# Patient Record
Sex: FEMALE | Race: WHITE | Employment: UNEMPLOYED | URBAN - METROPOLITAN AREA
[De-identification: names, ages, dates, MRNs, and addresses within clinical notes are randomized per-mention and may not be internally consistent; named-entity substitution may affect disease eponyms.]

---

## 2017-07-24 PROBLEM — F31.12 BIPOLAR I DISORDER, MOST RECENT EPISODE (OR CURRENT) MANIC, MODERATE (HCC): Status: ACTIVE | Noted: 2017-07-24

## 2024-03-25 NOTE — H&P (VIEW-ONLY)
Juan Antonio Israel is a 63 year old female.   Patient presents with:  Pre-Op Exam: Excision left tonsil lesion, excision uvula lesion on 4/3/24 w/ Dr. Robins at [facility]    HPI:    Here for preop before    Surgery: left tonsil lesion and uvula lesion excision  Date of surgery: 4/3/24  Requesting physician: Dr Manolo Robins  Indication: Left tonsil lesion, uvula lesion    Has hx VANGIE, doesn't use cpap. No other known hx of heart, lung or bleeding/clotting problems.     No prior problems with anesthesia with prior surgeries.     No current infectious symptoms.     Never chest pain. Usually walks daily and does chores. No chest pain with that.               Allergies:    Latex                   HIVES, RASH  Latex                   RASH   Current Meds:  Current Outpatient Medications   Medication Sig Dispense Refill   • amphetamine-dextroamphetamine (ADDERALL) 20 MG Oral Tab Take 2 tablets (40mg) PO  tablet 0   • ATORVASTATIN 40 MG Oral Tab TAKE 1 TABLET BY MOUTH EVERY DAY 90 tablet 0   • venlafaxine ER 75 MG Oral Capsule SR 24 Hr TAKE 1 CAPSULE BY MOUTH WITH BREAKFAST ALONG WITH VENLAFAXINE 150MG FOR TOTAL 225MG 90 capsule 3   • venlafaxine  MG Oral Capsule SR 24 Hr Take 1 capsule (150 mg total) by mouth daily with breakfast. 90 capsule 3   • lamoTRIgine 25 MG Oral Tab Take 1 tablet (25 mg total) by mouth 2 (two) times daily. For anxiety/agitation 180 tablet 3   • cloNIDine 0.1 MG Oral Tab Take 1-2 tablets (0.1-0.2 mg total) by mouth at bedtime. For sleep 180 tablet 3   • [START ON 4/14/2024] amphetamine-dextroamphetamine (ADDERALL) 20 MG Oral Tab Take 2 tablets (40mg) PO  tablet 0   • amphetamine-dextroamphetamine (ADDERALL) 20 MG Oral Tab Take 2 tablets (40 mg total) by mouth 2 (two) times daily. 120 tablet 0   • lurasidone (LATUDA) 40 MG Oral Tab Take 1 tablet (40 mg total) by mouth daily with breakfast. 90 tablet 3   • levothyroxine 112 MCG Oral Tab Take 1 tablet (112 mcg total) by mouth daily. 90  tablet 3          ROS:   Otherwise negative    Past Medical History:   Diagnosis Date   • ADHD (attention deficit hyperactivity disorder)     diagnosed at [facility] in  and is on disabilty   • Bipolar disorder (HCC)    • BRCA1 negative    • BRCA2 negative    • Cochlear implant status 2013   • Depression 2009   • ETOH abuse 2014   • H/O ETOH ABUSE 2009   • HEARING LOSS - deafness dev over 10 yrs unknown cause, deaf age 33, s/p coclear implant 2009   • Hyperlipidemia 2009   • Hypothyroidism 2009   • VANGIE (obstructive sleep apnea)    • Psychosis (HCC) 2017   • Tobacco Abuse 2011    smoker     Past Surgical History:   Procedure Laterality Date   • ABLATION  2010    D&C/hysteroscopy/Veronica sommer   • ADENOIDECTOMY     •   1984    breech   • COCHLEAR IMPLANT EXT SPEECH     • COLONOSCOPY     • HYSTEROSCOPY      normal cavity, EMC negative, raymundo Sommer   • MUSCLE BIOPSY      benign   • TONSILLECTOMY  childhood     Social History    Socioeconomic History      Marital status:       Spouse name: Not on file      Number of children: 4      Years of education: Not on file      Highest education level: Not on file    Occupational History      Occupation: Disability since     Tobacco Use      Smoking status: Every Day        Packs/day: 0.50        Years: 15.00        Additional pack years: 0.00        Total pack years: 7.50        Types: Cigarettes      Smokeless tobacco: Never      Tobacco comments: Trying to quit    Substance and Sexual Activity      Alcohol use: No        Alcohol/week: 0.0 standard drinks of alcohol        Comment: ex ETOH use      Drug use: No      Sexual activity: Yes        Partners: Male        Birth control/protection: Rhythm    Other Topics      Concerns:        Not on file    Social History Narrative            4 children      Disability since  (deafness and depression/ADHD)    Social  Determinants of Health  Financial Resource Strain: Not on file  Food Insecurity: Not on file  Transportation Needs: Not on file  Physical Activity: Not on file  Stress: Not on file  Social Connections: Not on file  Intimate Partner Violence: Not on file  Housing Stability: Not on file     PHYSICAL EXAM:   /83 (BP Location: Left arm, Patient Position: Sitting, Cuff Size: large)   Pulse 103   Ht 5' 9\" (1.753 m)   Wt 214 lb (97.1 kg)   LMP  (LMP Unknown)   BMI 31.60 kg/m²   GENERAL: well developed, well nourished, in no apparent distress  SKIN: no rashes, no suspicious lesions  EYES: PERRLA, EOMI, sclera anicteric, conjunctiva normal  HEENT:oropharynx clear  NECK: supple; FROM; nontender, no thyromegaly or adenopathy  RESPIRATORY: clear to auscultation  CARDIOVASCULAR:rrr no m/r/g  ABDOMEN: normal active BS+, soft, nondistended; no masses; nontender  EXTREMITIES: no cyanosis, clubbing or edema, peripheral pulses intact  NEUROLOGIC: intact; no sensorimotor deficit  PSYCHIATRIC: alert and oriented x 3; affect appropriate     ASSESSMENT/ PLAN:   62 yo woman w hx smoking, VANGIE, HL, hypothyroid presents for preop before    Surgery: left tonsil lesion and uvula lesion excision  Date of surgery: 4/3/24  Requesting physician: Dr Manolo Robins  Indication: Left tonsil lesion, uvula lesion    Has hx VANGIE, doesn't use cpap. No other known hx of heart, lung or bleeding/clotting problems.   --recommend extubating to bipap and careful periop monitoring    No prior problems with anesthesia with prior surgeries.     No current infectious symptoms.     CV: does 4 METS of exercise without ever getting chest pain.   --EKG today sinus tachycardia 102 bpm, biatrial enlargement, TWI aVR and V1 and V2, no other ST/Twave changes  --She is overall intermediate risk for intermediate risk surgery from cv standpoint     Recommended smoking cessation    Ordered labs    Assuming labs normal, recommend proceeding to planned surgery without  further workup or treatment changes    RTC prn    This note will be forwarded to the requesting physician upon closure.

## 2024-04-02 ENCOUNTER — ANESTHESIA EVENT (OUTPATIENT)
Dept: SURGERY | Facility: HOSPITAL | Age: 64
End: 2024-04-02
Payer: MEDICARE

## 2024-04-03 ENCOUNTER — ANESTHESIA (OUTPATIENT)
Dept: SURGERY | Facility: HOSPITAL | Age: 64
End: 2024-04-03
Payer: MEDICARE

## 2024-04-03 ENCOUNTER — HOSPITAL ENCOUNTER (OUTPATIENT)
Facility: HOSPITAL | Age: 64
Setting detail: HOSPITAL OUTPATIENT SURGERY
Discharge: HOME | End: 2024-04-03
Attending: OTOLARYNGOLOGY | Admitting: OTOLARYNGOLOGY
Payer: MEDICARE

## 2024-04-03 VITALS
HEART RATE: 80 BPM | OXYGEN SATURATION: 93 % | BODY MASS INDEX: 31.4 KG/M2 | TEMPERATURE: 98 F | DIASTOLIC BLOOD PRESSURE: 80 MMHG | SYSTOLIC BLOOD PRESSURE: 133 MMHG | WEIGHT: 212 LBS | HEIGHT: 69 IN | RESPIRATION RATE: 16 BRPM

## 2024-04-03 DIAGNOSIS — K13.70 LESION OF UVULA: ICD-10-CM

## 2024-04-03 DIAGNOSIS — J35.9 LESION OF TONSIL: Primary | ICD-10-CM

## 2024-04-03 PROCEDURE — 88305 TISSUE EXAM BY PATHOLOGIST: CPT | Performed by: OTOLARYNGOLOGY

## 2024-04-03 PROCEDURE — 0CBPXZX EXCISION OF TONSILS, EXTERNAL APPROACH, DIAGNOSTIC: ICD-10-PCS | Performed by: OTOLARYNGOLOGY

## 2024-04-03 PROCEDURE — 88304 TISSUE EXAM BY PATHOLOGIST: CPT | Performed by: OTOLARYNGOLOGY

## 2024-04-03 PROCEDURE — 0CBNXZX EXCISION OF UVULA, EXTERNAL APPROACH, DIAGNOSTIC: ICD-10-PCS | Performed by: OTOLARYNGOLOGY

## 2024-04-03 RX ORDER — ACETAMINOPHEN 500 MG
1000 TABLET ORAL ONCE
Status: DISCONTINUED | OUTPATIENT
Start: 2024-04-03 | End: 2024-04-03 | Stop reason: HOSPADM

## 2024-04-03 RX ORDER — PROCHLORPERAZINE EDISYLATE 5 MG/ML
5 INJECTION INTRAMUSCULAR; INTRAVENOUS EVERY 8 HOURS PRN
Status: DISCONTINUED | OUTPATIENT
Start: 2024-04-03 | End: 2024-04-03

## 2024-04-03 RX ORDER — HYDROMORPHONE HYDROCHLORIDE 1 MG/ML
0.2 INJECTION, SOLUTION INTRAMUSCULAR; INTRAVENOUS; SUBCUTANEOUS EVERY 5 MIN PRN
Status: DISCONTINUED | OUTPATIENT
Start: 2024-04-03 | End: 2024-04-03

## 2024-04-03 RX ORDER — ONDANSETRON 2 MG/ML
4 INJECTION INTRAMUSCULAR; INTRAVENOUS EVERY 6 HOURS PRN
Status: DISCONTINUED | OUTPATIENT
Start: 2024-04-03 | End: 2024-04-03

## 2024-04-03 RX ORDER — ACETAMINOPHEN 500 MG
1000 TABLET ORAL ONCE AS NEEDED
Status: DISCONTINUED | OUTPATIENT
Start: 2024-04-03 | End: 2024-04-03

## 2024-04-03 RX ORDER — HYDROMORPHONE HYDROCHLORIDE 1 MG/ML
0.4 INJECTION, SOLUTION INTRAMUSCULAR; INTRAVENOUS; SUBCUTANEOUS EVERY 5 MIN PRN
Status: DISCONTINUED | OUTPATIENT
Start: 2024-04-03 | End: 2024-04-03

## 2024-04-03 RX ORDER — ONDANSETRON 2 MG/ML
INJECTION INTRAMUSCULAR; INTRAVENOUS AS NEEDED
Status: DISCONTINUED | OUTPATIENT
Start: 2024-04-03 | End: 2024-04-03 | Stop reason: SURG

## 2024-04-03 RX ORDER — NEOSTIGMINE METHYLSULFATE 1 MG/ML
INJECTION, SOLUTION INTRAVENOUS AS NEEDED
Status: DISCONTINUED | OUTPATIENT
Start: 2024-04-03 | End: 2024-04-03 | Stop reason: SURG

## 2024-04-03 RX ORDER — LIDOCAINE HYDROCHLORIDE 10 MG/ML
INJECTION, SOLUTION EPIDURAL; INFILTRATION; INTRACAUDAL; PERINEURAL AS NEEDED
Status: DISCONTINUED | OUTPATIENT
Start: 2024-04-03 | End: 2024-04-03 | Stop reason: SURG

## 2024-04-03 RX ORDER — DEXAMETHASONE SODIUM PHOSPHATE 4 MG/ML
VIAL (ML) INJECTION AS NEEDED
Status: DISCONTINUED | OUTPATIENT
Start: 2024-04-03 | End: 2024-04-03 | Stop reason: SURG

## 2024-04-03 RX ORDER — SODIUM CHLORIDE, SODIUM LACTATE, POTASSIUM CHLORIDE, CALCIUM CHLORIDE 600; 310; 30; 20 MG/100ML; MG/100ML; MG/100ML; MG/100ML
INJECTION, SOLUTION INTRAVENOUS CONTINUOUS
Status: DISCONTINUED | OUTPATIENT
Start: 2024-04-03 | End: 2024-04-03

## 2024-04-03 RX ORDER — ACETAMINOPHEN AND CODEINE PHOSPHATE 300; 30 MG/1; MG/1
1-2 TABLET ORAL EVERY 4 HOURS PRN
Qty: 20 TABLET | Refills: 0 | Status: SHIPPED | OUTPATIENT
Start: 2024-04-03

## 2024-04-03 RX ORDER — HYDROCODONE BITARTRATE AND ACETAMINOPHEN 5; 325 MG/1; MG/1
1 TABLET ORAL ONCE AS NEEDED
Status: DISCONTINUED | OUTPATIENT
Start: 2024-04-03 | End: 2024-04-03

## 2024-04-03 RX ORDER — SCOLOPAMINE TRANSDERMAL SYSTEM 1 MG/1
1 PATCH, EXTENDED RELEASE TRANSDERMAL ONCE
Status: DISCONTINUED | OUTPATIENT
Start: 2024-04-03 | End: 2024-04-03 | Stop reason: HOSPADM

## 2024-04-03 RX ORDER — HYDROMORPHONE HYDROCHLORIDE 1 MG/ML
0.6 INJECTION, SOLUTION INTRAMUSCULAR; INTRAVENOUS; SUBCUTANEOUS EVERY 5 MIN PRN
Status: DISCONTINUED | OUTPATIENT
Start: 2024-04-03 | End: 2024-04-03

## 2024-04-03 RX ORDER — HYDROCODONE BITARTRATE AND ACETAMINOPHEN 5; 325 MG/1; MG/1
2 TABLET ORAL ONCE AS NEEDED
Status: DISCONTINUED | OUTPATIENT
Start: 2024-04-03 | End: 2024-04-03

## 2024-04-03 RX ORDER — NALOXONE HYDROCHLORIDE 0.4 MG/ML
0.08 INJECTION, SOLUTION INTRAMUSCULAR; INTRAVENOUS; SUBCUTANEOUS AS NEEDED
Status: DISCONTINUED | OUTPATIENT
Start: 2024-04-03 | End: 2024-04-03

## 2024-04-03 RX ORDER — GLYCOPYRROLATE 0.2 MG/ML
INJECTION, SOLUTION INTRAMUSCULAR; INTRAVENOUS AS NEEDED
Status: DISCONTINUED | OUTPATIENT
Start: 2024-04-03 | End: 2024-04-03 | Stop reason: SURG

## 2024-04-03 RX ORDER — OXYMETAZOLINE HYDROCHLORIDE 0.05 G/100ML
SPRAY NASAL AS NEEDED
Status: DISCONTINUED | OUTPATIENT
Start: 2024-04-03 | End: 2024-04-03 | Stop reason: HOSPADM

## 2024-04-03 RX ORDER — MIDAZOLAM HYDROCHLORIDE 1 MG/ML
INJECTION INTRAMUSCULAR; INTRAVENOUS AS NEEDED
Status: DISCONTINUED | OUTPATIENT
Start: 2024-04-03 | End: 2024-04-03 | Stop reason: SURG

## 2024-04-03 RX ORDER — PHENYLEPHRINE HCL 10 MG/ML
VIAL (ML) INJECTION AS NEEDED
Status: DISCONTINUED | OUTPATIENT
Start: 2024-04-03 | End: 2024-04-03 | Stop reason: SURG

## 2024-04-03 RX ORDER — ROCURONIUM BROMIDE 10 MG/ML
INJECTION, SOLUTION INTRAVENOUS AS NEEDED
Status: DISCONTINUED | OUTPATIENT
Start: 2024-04-03 | End: 2024-04-03 | Stop reason: SURG

## 2024-04-03 RX ADMIN — ONDANSETRON 4 MG: 2 INJECTION INTRAMUSCULAR; INTRAVENOUS at 14:05:00

## 2024-04-03 RX ADMIN — NEOSTIGMINE METHYLSULFATE 3 MG: 1 INJECTION, SOLUTION INTRAVENOUS at 14:21:00

## 2024-04-03 RX ADMIN — SODIUM CHLORIDE, SODIUM LACTATE, POTASSIUM CHLORIDE, CALCIUM CHLORIDE: 600; 310; 30; 20 INJECTION, SOLUTION INTRAVENOUS at 14:34:00

## 2024-04-03 RX ADMIN — MIDAZOLAM HYDROCHLORIDE 2 MG: 1 INJECTION INTRAMUSCULAR; INTRAVENOUS at 13:55:00

## 2024-04-03 RX ADMIN — PHENYLEPHRINE HCL 150 MCG: 10 MG/ML VIAL (ML) INJECTION at 14:18:00

## 2024-04-03 RX ADMIN — ROCURONIUM BROMIDE 15 MG: 10 INJECTION, SOLUTION INTRAVENOUS at 13:58:00

## 2024-04-03 RX ADMIN — SODIUM CHLORIDE, SODIUM LACTATE, POTASSIUM CHLORIDE, CALCIUM CHLORIDE: 600; 310; 30; 20 INJECTION, SOLUTION INTRAVENOUS at 13:51:00

## 2024-04-03 RX ADMIN — DEXAMETHASONE SODIUM PHOSPHATE 4 MG: 4 MG/ML VIAL (ML) INJECTION at 14:05:00

## 2024-04-03 RX ADMIN — PHENYLEPHRINE HCL 100 MCG: 10 MG/ML VIAL (ML) INJECTION at 14:14:00

## 2024-04-03 RX ADMIN — LIDOCAINE HYDROCHLORIDE 100 MG: 10 INJECTION, SOLUTION EPIDURAL; INFILTRATION; INTRACAUDAL; PERINEURAL at 13:57:00

## 2024-04-03 RX ADMIN — GLYCOPYRROLATE 0.4 MG: 0.2 INJECTION, SOLUTION INTRAMUSCULAR; INTRAVENOUS at 14:21:00

## 2024-04-03 NOTE — DISCHARGE INSTRUCTIONS
A small amount of bleeding is normal after surgery  Tylenol or tylenol 3 for pain  Soft foods for a week  We will call with pathology results

## 2024-04-03 NOTE — OPERATIVE REPORT
PATIENT NAME: Juan Antonio Israel   MRN: [id number]    DATE OF OPERATION: 4/3/2024     PREOPERATIVE DIAGNOSES    1. Left tonsil lesion   2. Uvula/palate lesion    POSTOPERATIVE DIAGNOSES    1. Left tonsil lesion   2. Uvula/palate lesion     PROCEDURE:     1. Excision of left tonsil lesion   2. Excision of palate/uvula lesion    SURGEON: Manolo Robins MD     ASSISTANT: None.     INDICATION: The patient is a 63 year old-year-old female who presented with a left tonsil and uvula lesion.  She has a long standing history of smoking.  Discussions were held with the patient and family regarding treatment options including observation or surgery, and patient decided to go ahead with surgical excision of these lesions.     FINDINGS: left tonsil lesion, uvula lesion    DESCRIPTION OF PROCEDURE: The patient was identified in the preoperative holding area and again in the operating room. The patient was moved from the stretcher to the operating room table and turned over to Anesthesia for sedation and intubation. The patient was sedated and intubated without complication. A time-out was performed confirming the patient's name, age, date of birth, procedure, and allergies. The patient was then turned 90 degrees and then turned over to the surgeon for the procedure.       A shoulder roll was placed and the patient was positioned in the Tanya position. A Manda-Margarito mouth gag was placed and the soft palate and oropharynx were visualized. The tonsil lesion was noted on the left side. It was excised with a tenotomy. Hemostasis was obtained with silver nitrate and light bipolar cautery. Monopolar cautery was noted used as the patient has a cochlear implant.     The uvula lesion was grasped with a forceps. It was excised with a tenotomy. Hemostasis was obtained with silver nitrate. Monopolar cautery was noted used as the patient has a cochlear implant.     The patient was then turned 90 degrees and turned over to anesthesia for wake-up.  Patient woke up without complications. she was transferred from the operating room table to the stretcher and from the stretcher to the PACU in stable condition.     ESTIMATED BLOOD LOSS: 2 mL     COMPLICATIONS: None.     SPECIMENS   Left tonsil lesion  Uvula lesion  PLAN: The patient will take pain medication and follow up in 2 weeks     Manolo Robins MD

## 2024-04-03 NOTE — ANESTHESIA PREPROCEDURE EVALUATION
PRE-OP EVALUATION    Patient Name: Juan Antonio Israel    Admit Diagnosis: LEFT TONSIL LESION    Pre-op Diagnosis: LEFT TONSIL LESION    EXCISION  LEFT TONSIL LESION  EXCISION UVULA LESION    Anesthesia Procedure: EXCISION  LEFT TONSIL LESION EXCISION UVULA LESION (Left)    Surgeon(s) and Role:     * Manolo Robins MD - Primary    Pre-op vitals reviewed.  Temp: 98.2 °F (36.8 °C)  Pulse: 70  Resp: 16  BP: 126/82  SpO2: 93 %  Body mass index is 31.31 kg/m².    Current medications reviewed.  Hospital Medications:   [MAR Hold] acetaminophen (Tylenol Extra Strength) tab 1,000 mg  1,000 mg Oral Once    [MAR Hold] scopolamine (Transderm-Scop) 1 MG/3DAYS patch 1 patch  1 patch Transdermal Once    lactated ringers infusion   Intravenous Continuous       Outpatient Medications:     Medications Prior to Admission   Medication Sig Dispense Refill Last Dose    amphetamine-dextroamphetamine (ADDERALL) 20 MG Oral Tab Take 2 tablets (40 mg total) by mouth 2 (two) times daily. 120 tablet 0 4/2/2024 at 1500    lurasidone (LATUDA) 40 MG Oral Tab Take 1 tablet (40 mg total) by mouth daily with breakfast. 90 tablet 3 4/3/2024 at 0900    venlafaxine ER 75 MG Oral Capsule SR 24 Hr TAKE 1 CAPSULE BY MOUTH WITH BREAKFAST ALONG WITH VENLAFAXINE 150MG FOR TOTAL 225MG 90 capsule 1 4/3/2024    venlafaxine  MG Oral Capsule SR 24 Hr Take 1 capsule (150 mg total) by mouth daily with breakfast. 90 capsule 1 4/3/2024 at 0900    atorvastatin 40 MG Oral Tab Take 1 tablet (40 mg total) by mouth daily. 90 tablet 1 4/3/2024 at 0900    levothyroxine 112 MCG Oral Tab Take 1 tablet (112 mcg total) by mouth daily. 90 tablet 1 4/3/2024 at 0900       Allergies: Latex      Anesthesia Evaluation    Patient summary reviewed.    Anesthetic Complications  (-) history of anesthetic complications         GI/Hepatic/Renal             (-) chronic renal disease   (-) liver disease                 Cardiovascular        Exercise tolerance: good     MET: >4    (+)  obesity     (+) hyperlipidemia  (-) CAD  (-) past MI                (-) angina     (-) FOSTER         Endo/Other      (-) diabetes     (+) hypothyroidism                       Pulmonary      (-) asthma  (-) COPD            (+) sleep apnea and noncompliant      Neuro/Psych      (+) depression           (+) neuromuscular disease                     Past Surgical History:   Procedure Laterality Date    ABLATION  2010    D&C/hysteroscopy/Veronica sommer    ADENOIDECTOMY            breech    COCHLEAR IMPLANT EXT SPEECH      COLONOSCOPY      HYSTEROSCOPY      normal cavity, EMC negative, raymundo Sommer    MUSCLE BIOPSY      benign    TONSILLECTOMY  childhood     Social History     Socioeconomic History    Marital status:     Number of children: 4   Occupational History    Occupation: Disability since    Tobacco Use    Smoking status: Every Day     Packs/day: 0.50     Years: 15.00     Additional pack years: 0.00     Total pack years: 7.50     Types: Cigarettes    Smokeless tobacco: Never    Tobacco comments:     Trying to quit   Vaping Use    Vaping Use: Never used   Substance and Sexual Activity    Alcohol use: No     Alcohol/week: 0.0 standard drinks of alcohol     Comment: ex ETOH use    Drug use: No    Sexual activity: Yes     Partners: Male     Birth control/protection: Rhythm     History   Drug Use No     Available pre-op labs reviewed.               Airway      Mallampati: III  Mouth opening: >3 FB  TM distance: > 6 cm  Neck ROM: full Cardiovascular    Cardiovascular exam normal.         Dental             Pulmonary    Pulmonary exam normal.                 Other findings              ASA: 2   Plan: general  NPO status verified and patient meets guidelines.  Patient has not taken beta blockers in last 24 hours.  Post-procedure pain management plan discussed with surgeon and patient.    Comment: I spoke with the patient and discussed the risks of general anesthesia,  which include allergic reaction, nausea, vomiting, dental injury, sore throat, awareness, hypotension, as well as more serious cardiac and pulmonary complications. The patient understands and consents to receiving general anesthesia for this procedure.    Plan/risks discussed with: patient                Present on Admission:  **None**

## 2024-04-03 NOTE — INTERVAL H&P NOTE
Pre-op Diagnosis: LEFT TONSIL LESION    The above referenced H&P was reviewed by Manolo Robins MD on 4/3/2024, the patient was examined and no significant changes have occurred in the patient's condition since the H&P was performed.  I discussed with the patient and/or legal representative the potential benefits, risks and side effects of this procedure; the likelihood of the patient achieving goals; and potential problems that might occur during recuperation.  I discussed reasonable alternatives to the procedure, including risks, benefits and side effects related to the alternatives and risks related to not receiving this procedure.  We will proceed with procedure as planned.

## 2024-04-03 NOTE — ANESTHESIA PROCEDURE NOTES
Airway  Date/Time: 4/3/2024 2:01 PM  Urgency: elective    Airway not difficult    General Information and Staff    Patient location during procedure: OR  Anesthesiologist: Kyra Montes De Oca MD  Performed: anesthesiologist   Performed by: Kyra Montes De Oca MD  Authorized by: Kyra Montes De Oca MD      Indications and Patient Condition  Indications for airway management: anesthesia  Spontaneous Ventilation: absent  Sedation level: deep  Preoxygenated: yes  Patient position: sniffing  Mask difficulty assessment: 2 - vent by mask + OA or adjuvant +/- NMBA    Final Airway Details  Final airway type: endotracheal airway      Successful airway: ETT and oral KATHRINE  Cuffed: yes   Successful intubation technique: direct laryngoscopy  Facilitating devices/methods: cricoid pressure  Endotracheal tube insertion site: oral  Blade: Maciej  Blade size: #3  ETT size (mm): 7.0    Cormack-Lehane Classification: grade IIB - view of arytenoids or posterior of glottis only  Placement verified by: capnometry   Cuff volume (mL): 6  Measured from: lips  ETT to lips (cm): 22  Number of attempts at approach: 1  Number of other approaches attempted: 0

## (undated) DEVICE — STERILE SYNTHETIC POLYISOPRENE POWDER-FREE SURGICAL GLOVES WITH HYDROGEL COATING, SMOOTH FINISH, STRAIGHT FINGER: Brand: PROTEXIS

## (undated) DEVICE — MEDI-VAC NON-CONDUCTIVE SUCTION TUBING: Brand: CARDINAL HEALTH

## (undated) DEVICE — T & A CDS: Brand: MEDLINE INDUSTRIES, INC.

## (undated) DEVICE — GOWN,SIRUS,FABRIC-REINFORCED,X-LARGE: Brand: MEDLINE

## (undated) DEVICE — SOLUTION IRRIG 1000ML 0.9% NACL USP BTL

## (undated) DEVICE — CODMAN® SURGICAL PATTIES 1/2" X 3" (1.27CM X 7.62CM): Brand: CODMAN®